# Patient Record
Sex: MALE | Race: OTHER | Employment: OTHER | URBAN - METROPOLITAN AREA
[De-identification: names, ages, dates, MRNs, and addresses within clinical notes are randomized per-mention and may not be internally consistent; named-entity substitution may affect disease eponyms.]

---

## 2017-04-24 DIAGNOSIS — M25.512 ACUTE PAIN OF LEFT SHOULDER: Primary | ICD-10-CM

## 2017-04-27 ENCOUNTER — OFFICE VISIT (OUTPATIENT)
Dept: ORTHOPEDIC SURGERY | Age: 42
End: 2017-04-27
Payer: COMMERCIAL

## 2017-04-27 ENCOUNTER — TELEPHONE (OUTPATIENT)
Dept: ORTHOPEDIC SURGERY | Age: 42
End: 2017-04-27

## 2017-04-27 ENCOUNTER — HOSPITAL ENCOUNTER (OUTPATIENT)
Dept: GENERAL RADIOLOGY | Facility: CLINIC | Age: 42
Discharge: HOME | End: 2017-04-27
Payer: COMMERCIAL

## 2017-04-27 VITALS
HEIGHT: 70 IN | BODY MASS INDEX: 34.36 KG/M2 | WEIGHT: 240 LBS | HEART RATE: 67 BPM | SYSTOLIC BLOOD PRESSURE: 130 MMHG | DIASTOLIC BLOOD PRESSURE: 92 MMHG

## 2017-04-27 DIAGNOSIS — M25.512 ACUTE PAIN OF LEFT SHOULDER: ICD-10-CM

## 2017-04-27 DIAGNOSIS — M75.102 ROTATOR CUFF SYNDROME OF LEFT SHOULDER: Primary | ICD-10-CM

## 2017-04-27 PROCEDURE — 73030 X-RAY EXAM OF SHOULDER: CPT

## 2017-04-27 PROCEDURE — 99203 OFFICE O/P NEW LOW 30 MIN: CPT | Performed by: FAMILY MEDICINE

## 2017-05-03 ENCOUNTER — HOSPITAL ENCOUNTER (OUTPATIENT)
Dept: PHYSICAL THERAPY | Facility: CLINIC | Age: 42
Setting detail: THERAPIES SERIES
Discharge: HOME | End: 2017-05-03
Payer: COMMERCIAL

## 2018-05-03 ENCOUNTER — OFFICE VISIT (OUTPATIENT)
Dept: INTERNAL MEDICINE | Age: 43
End: 2018-05-03
Payer: COMMERCIAL

## 2018-05-03 VITALS
DIASTOLIC BLOOD PRESSURE: 78 MMHG | BODY MASS INDEX: 34.52 KG/M2 | HEART RATE: 93 BPM | HEIGHT: 71 IN | WEIGHT: 246.6 LBS | SYSTOLIC BLOOD PRESSURE: 112 MMHG

## 2018-05-03 DIAGNOSIS — J00 ACUTE NASOPHARYNGITIS: Primary | ICD-10-CM

## 2018-05-03 PROCEDURE — 99213 OFFICE O/P EST LOW 20 MIN: CPT | Performed by: INTERNAL MEDICINE

## 2018-05-03 RX ORDER — FLUTICASONE PROPIONATE 50 MCG
1 SPRAY, SUSPENSION (ML) NASAL DAILY
Qty: 1 BOTTLE | Refills: 3 | Status: SHIPPED | OUTPATIENT
Start: 2018-05-03 | End: 2020-08-04

## 2018-05-03 RX ORDER — AZITHROMYCIN 250 MG/1
TABLET, FILM COATED ORAL
Qty: 1 PACKET | Refills: 0 | Status: SHIPPED | OUTPATIENT
Start: 2018-05-03 | End: 2018-05-07

## 2018-05-03 ASSESSMENT — ENCOUNTER SYMPTOMS
WHEEZING: 0
NAUSEA: 0
DIARRHEA: 0
SHORTNESS OF BREATH: 0
SORE THROAT: 1
SPUTUM PRODUCTION: 1
VOMITING: 0
HEARTBURN: 0
COUGH: 1
BLURRED VISION: 0
SINUS PAIN: 0
EYE PAIN: 0
DOUBLE VISION: 0
ABDOMINAL PAIN: 0
BACK PAIN: 0

## 2018-05-03 ASSESSMENT — PATIENT HEALTH QUESTIONNAIRE - PHQ9
SUM OF ALL RESPONSES TO PHQ9 QUESTIONS 1 & 2: 0
1. LITTLE INTEREST OR PLEASURE IN DOING THINGS: 0
2. FEELING DOWN, DEPRESSED OR HOPELESS: 0
SUM OF ALL RESPONSES TO PHQ QUESTIONS 1-9: 0

## 2020-08-04 ENCOUNTER — OFFICE VISIT (OUTPATIENT)
Dept: PRIMARY CARE CLINIC | Age: 45
End: 2020-08-04
Payer: COMMERCIAL

## 2020-08-04 VITALS
DIASTOLIC BLOOD PRESSURE: 78 MMHG | WEIGHT: 250.8 LBS | BODY MASS INDEX: 35.11 KG/M2 | TEMPERATURE: 97.3 F | HEIGHT: 71 IN | HEART RATE: 77 BPM | SYSTOLIC BLOOD PRESSURE: 116 MMHG | OXYGEN SATURATION: 97 %

## 2020-08-04 PROBLEM — E53.8 VITAMIN B12 DEFICIENCY: Status: ACTIVE | Noted: 2020-08-04

## 2020-08-04 PROBLEM — R79.89 LOW TESTOSTERONE: Status: ACTIVE | Noted: 2020-08-04

## 2020-08-04 PROBLEM — J45.20 MILD INTERMITTENT ASTHMA WITHOUT COMPLICATION: Status: ACTIVE | Noted: 2020-08-04

## 2020-08-04 PROCEDURE — 99213 OFFICE O/P EST LOW 20 MIN: CPT | Performed by: FAMILY MEDICINE

## 2020-08-04 RX ORDER — FLUTICASONE FUROATE AND VILANTEROL TRIFENATATE 100; 25 UG/1; UG/1
1 POWDER RESPIRATORY (INHALATION) DAILY
Qty: 28 EACH | Refills: 5 | Status: SHIPPED | OUTPATIENT
Start: 2020-08-04

## 2020-08-04 ASSESSMENT — ENCOUNTER SYMPTOMS
SORE THROAT: 0
VOMITING: 0
EYE REDNESS: 0
ABDOMINAL PAIN: 0
WHEEZING: 0
SHORTNESS OF BREATH: 0
DIARRHEA: 0
RHINORRHEA: 0
EYE DISCHARGE: 0
COUGH: 0
NAUSEA: 0

## 2020-08-04 ASSESSMENT — PATIENT HEALTH QUESTIONNAIRE - PHQ9
SUM OF ALL RESPONSES TO PHQ9 QUESTIONS 1 & 2: 0
1. LITTLE INTEREST OR PLEASURE IN DOING THINGS: 0
2. FEELING DOWN, DEPRESSED OR HOPELESS: 0
SUM OF ALL RESPONSES TO PHQ QUESTIONS 1-9: 0
SUM OF ALL RESPONSES TO PHQ QUESTIONS 1-9: 0

## 2020-08-04 NOTE — PROGRESS NOTES
[address] [facility] PRIMARY CARE  [address] [facility] [address]  Dept: [id number]  is a 50 y.o. male who presents today as an new patient for his medical conditionsas noted below. Chief Complaint   Patient presents with    New Patient       HPI:     HPI   Patient presents to establish care. He has not had a stable primary care in a while. He has a history of asthma, low testosterone, and B12 deficiency. He currently takes no medications except for a rescue inhaler as needed for asthma symptoms. He states he has exercised induced asthma but not prophylactically. He has not been exercising as much due to fear of his asthma symptoms. He has intermittent asthma symptoms at work due to exposure to industrial  and clorox wipes. He has not used his rescue inhaler in 3 months, but has been limiting his activity. He currently has low libido and intermittent erectile dysfunction has an interest in restarting medications for testosterone. His last B12 injection was a couple of years ago. He currently takes Vitamin D, elderberry, tumeric, and other herbal supplements. His last dental exam was approximately a year ago. His last eye exam was within the past 5 months. His last Tdap was approximately 6-7 years ago. Patient interested in trying maintenance medication. States not able to exercise on a regular basis due to getting short of breath.     LDL Cholesterol (mg/dL)   Date Value   06/15/2016 120       (goal LDL is <100)   AST (U/L)   Date Value   06/15/2016 14     ALT (U/L)   Date Value   06/15/2016 19     BUN (mg/dL)   Date Value   06/15/2016 19     BP Readings from Last 3 Encounters:   08/04/20 116/78   05/03/18 112/78   04/27/17 (!) 130/92          (goal 120/80)    Past Medical History:   Diagnosis Date    Mild intermittent asthma without complication 8/7/1342      Past Surgical History:   Procedure Laterality Date    FINGER SURGERY      FRACTURE SURGERY      HERNIA REPAIR      x3    JOINT REPLACEMENT      KNEE ARTHROSCOPY Right 05/26/2015    KNEE SURGERY      NOSE SURGERY         Family History   Problem Relation Age of Onset    No Known Problems Mother     Diabetes Father     No Known Problems Brother        Social History     Tobacco Use    Smoking status: Never Smoker    Smokeless tobacco: Former User   Substance Use Topics    Alcohol use: No      No current outpatient medications on file. No current facility-administered medications for this visit. Allergies   Allergen Reactions    Penicillins        Health Maintenance   Topic Date Due    HIV screen  06/18/1988    DTaP/Tdap/Td vaccine (1 - Tdap) 06/18/1992    Diabetes screen  12/31/2018    Flu vaccine (1) 09/01/2020    Lipid screen  06/15/2021    Hepatitis A vaccine  Aged Out    Hepatitis B vaccine  Aged Out    Hib vaccine  Aged Out    Meningococcal (ACWY) vaccine  Aged Out    Pneumococcal 0-64 years Vaccine  Aged Out       Subjective:     Review of Systems   Constitutional: Negative for chills and fever. HENT: Negative for rhinorrhea and sore throat. Eyes: Negative for discharge and redness. Respiratory: Negative for cough, shortness of breath and wheezing. Cardiovascular: Negative for chest pain and palpitations. Gastrointestinal: Negative for abdominal pain, diarrhea, nausea and vomiting. Genitourinary: Negative for dysuria and frequency. Musculoskeletal: Positive for arthralgias. Negative for myalgias. Neurological: Negative for dizziness, light-headedness and headaches. Psychiatric/Behavioral: Negative for sleep disturbance. Objective:     /78   Pulse 77   Temp 97.3 °F (36.3 °C)   Ht 5' 11.04\" (1.804 m)   Wt 250 lb 12.8 oz (113.8 kg)   SpO2 97%   BMI 34.94 kg/m²   Physical Exam  Vitals signs and nursing note reviewed. Constitutional:       General: He is not in acute distress. Appearance: He is well-developed.

## 2020-08-05 LAB
ANION GAP SERPL CALCULATED.3IONS-SCNC: 8 MMOL/L (ref 5–15)
BUN BLDV-MCNC: 14 MG/DL (ref 5–23)
CALCIUM SERPL-MCNC: 9.6 MG/DL (ref 8.5–10.5)
CHLORIDE BLD-SCNC: 104 MMOL/L (ref 98–109)
CHOLESTEROL/HDL RATIO: 5.5 (ref 1–5)
CHOLESTEROL: 218 MG/DL (ref 150–200)
CO2: 27 MMOL/L (ref 22–32)
CREAT SERPL-MCNC: 1.31 MG/DL (ref 0.6–1.3)
EGFR AFRICAN AMERICAN: >60 ML/MIN/1.73SQ.M
EGFR IF NONAFRICAN AMERICAN: 59 ML/MIN/1.73SQ.M
GLUCOSE: 111 MG/DL (ref 65–99)
HDLC SERPL-MCNC: 40 MG/DL
LDL CHOLESTEROL CALCULATED: 154 MG/DL
LDL/HDL RATIO: 3.9
POTASSIUM SERPL-SCNC: 4.2 MMOL/L (ref 3.5–5)
SODIUM BLD-SCNC: 139 MMOL/L (ref 134–146)
TESTOSTERONE TOTAL: 2.5 NG/ML (ref 1.68–7.46)
TRIGL SERPL-MCNC: 119 MG/DL (ref 27–150)
TSH SERPL DL<=0.05 MIU/L-ACNC: 2.54 UIU/ML (ref 0.49–4.67)
VITAMIN B-12: 396 PG/ML (ref 180–914)
VLDLC SERPL CALC-MCNC: 24 MG/DL (ref 0–30)

## 2020-08-07 ENCOUNTER — TELEPHONE (OUTPATIENT)
Dept: PRIMARY CARE CLINIC | Age: 45
End: 2020-08-07

## 2020-08-07 NOTE — TELEPHONE ENCOUNTER
Patient was given results of blood work yesterday and Jie had send back that patient would like to restart his testosterone. Can you please address since Dr. Flakita Mcnamara is out office until Monday patient is calling about this. It is under result notes.

## 2020-08-07 NOTE — TELEPHONE ENCOUNTER
Patient called asking why he never received a call back about his testosterone. I let him know Dr. Tara Matute did not address this yet and I could have another provider address. He said I need to know is this how it always works because I am new to the office and I was told my cholesterol and sugar was a little high the rest was okay. I let him know the numbers and the ranges he said well he isn't going to talk to me and tell me what I need to do for this and compare HDL and LDL I once again gave numbers and let him know diet and exercise would help to get better numbers. He then said well we talked about my B12 and nothing was said about that. I gave his number and the ranges and let him know it was in range he said well yeah it is in range because I take B12 daily never mind. I then said I can get you in for an appt. To discuss with Dr. Tara Matute all of your labs he said no then I have to pay. He then said again can I expect this every time which I said yes that is what we do we call all results normal or abnormal and if they were abnormal he would need an appt. To discuss with Dr. Tara Matute but at this time his numbers are just a little above average so he is not recommending an appt. I once again offered appt. And he said no you just want me to pay and said goodbye I said goodbye and hung up.

## 2020-08-11 RX ORDER — TESTOSTERONE GEL, 1% 10 MG/G
50 GEL TRANSDERMAL DAILY
Qty: 30 EACH | Refills: 5 | Status: SHIPPED | OUTPATIENT
Start: 2020-08-11 | End: 2020-09-10

## 2020-08-11 NOTE — TELEPHONE ENCOUNTER
Testim sent in to [facility]. Insurance company might require a second low testosterone level, we will need to see if it clears at the pharmacy.     If not, let me know, will order repeat test.

## 2021-03-16 ENCOUNTER — TELEPHONE (OUTPATIENT)
Dept: PRIMARY CARE CLINIC | Age: 46
End: 2021-03-16

## 2021-03-16 NOTE — TELEPHONE ENCOUNTER
Patient was informed. He requested a note and asked that it be mailed to him. Letter was printed and mailed.

## 2022-07-20 ENCOUNTER — HOSPITAL ENCOUNTER (OUTPATIENT)
Age: 47
Setting detail: SPECIMEN
Discharge: HOME | End: 2022-07-20

## 2022-07-20 LAB — TESTOSTERONE TOTAL: 268 NG/DL (ref 220–1000)
